# Patient Record
Sex: FEMALE
[De-identification: names, ages, dates, MRNs, and addresses within clinical notes are randomized per-mention and may not be internally consistent; named-entity substitution may affect disease eponyms.]

---

## 2019-03-17 ENCOUNTER — TRANSCRIPTION ENCOUNTER (OUTPATIENT)
Age: 49
End: 2019-03-17

## 2020-11-09 ENCOUNTER — TRANSCRIPTION ENCOUNTER (OUTPATIENT)
Age: 50
End: 2020-11-09

## 2021-09-11 ENCOUNTER — TRANSCRIPTION ENCOUNTER (OUTPATIENT)
Age: 51
End: 2021-09-11

## 2021-10-08 ENCOUNTER — TRANSCRIPTION ENCOUNTER (OUTPATIENT)
Age: 51
End: 2021-10-08

## 2022-06-02 ENCOUNTER — NON-APPOINTMENT (OUTPATIENT)
Age: 52
End: 2022-06-02

## 2022-08-16 ENCOUNTER — NON-APPOINTMENT (OUTPATIENT)
Age: 52
End: 2022-08-16

## 2023-03-02 ENCOUNTER — NON-APPOINTMENT (OUTPATIENT)
Age: 53
End: 2023-03-02

## 2023-03-15 ENCOUNTER — NON-APPOINTMENT (OUTPATIENT)
Age: 53
End: 2023-03-15

## 2023-04-28 PROBLEM — Z00.00 ENCOUNTER FOR PREVENTIVE HEALTH EXAMINATION: Status: ACTIVE | Noted: 2023-04-28

## 2023-05-08 ENCOUNTER — APPOINTMENT (OUTPATIENT)
Dept: ORTHOPEDIC SURGERY | Facility: CLINIC | Age: 53
End: 2023-05-08

## 2024-08-29 ENCOUNTER — APPOINTMENT (OUTPATIENT)
Dept: ORTHOPEDIC SURGERY | Facility: CLINIC | Age: 54
End: 2024-08-29
Payer: COMMERCIAL

## 2024-08-29 VITALS — BODY MASS INDEX: 33.63 KG/M2 | HEIGHT: 64 IN | WEIGHT: 197 LBS

## 2024-08-29 DIAGNOSIS — Z78.9 OTHER SPECIFIED HEALTH STATUS: ICD-10-CM

## 2024-08-29 DIAGNOSIS — M25.561 PAIN IN RIGHT KNEE: ICD-10-CM

## 2024-08-29 DIAGNOSIS — M17.11 UNILATERAL PRIMARY OSTEOARTHRITIS, RIGHT KNEE: ICD-10-CM

## 2024-08-29 DIAGNOSIS — E11.9 TYPE 2 DIABETES MELLITUS W/OUT COMPLICATIONS: ICD-10-CM

## 2024-08-29 DIAGNOSIS — G89.29 PAIN IN RIGHT KNEE: ICD-10-CM

## 2024-08-29 PROCEDURE — 99204 OFFICE O/P NEW MOD 45 MIN: CPT | Mod: 25

## 2024-08-29 PROCEDURE — 73562 X-RAY EXAM OF KNEE 3: CPT | Mod: RT

## 2024-08-29 PROCEDURE — J3490M: CUSTOM

## 2024-08-29 PROCEDURE — 20610 DRAIN/INJ JOINT/BURSA W/O US: CPT | Mod: RT

## 2024-08-29 RX ORDER — TIRZEPATIDE 7.5 MG/.5ML
INJECTION, SOLUTION SUBCUTANEOUS
Refills: 0 | Status: ACTIVE | COMMUNITY

## 2024-08-29 RX ORDER — MELOXICAM 15 MG/1
15 TABLET ORAL
Qty: 30 | Refills: 0 | Status: ACTIVE | COMMUNITY
Start: 2024-08-29 | End: 1900-01-01

## 2024-08-29 RX ORDER — LEVOTHYROXINE SODIUM 100 UG/1
100 TABLET ORAL
Refills: 0 | Status: ACTIVE | COMMUNITY

## 2024-08-30 PROBLEM — M17.11 PRIMARY OSTEOARTHRITIS OF RIGHT KNEE: Status: ACTIVE | Noted: 2024-08-29

## 2024-08-30 NOTE — IMAGING
[Right] : right knee [All Views] : anteroposterior, lateral, skyline, and anteroposterior standing [de-identified] : The patient is a well appearing 54 year  old female of their stated age. Patient ambulates with a normal gait. Negative straight leg raise bilateral.    Effected Knee: RIGHT  ROM:  0-130 degrees Lachman: Negative Pivot Shift: Negative Anterior Drawer: Negative Posterior Drawer / Sag: Negative Varus Stress 0 degrees: Stable Varus Stress 30 degrees: Stable Valgus Stress 0 degrees: Stable Valgus Stress 30 degrees: Stable Medial Bari: Negative Lateral Bari: Negative Patella Glide: 2+ Patella Apprehension: Negative Patella Grind: POSITIVE  Pes [place] Valgus: Negative Pes Cavus: Negative   Palpation: Medial Joint Line: TENDER  Lateral Joint Line: TENDER  Medial Collateral Ligament: Nontender Lateral Collateral Ligament/PLC: Nontender Distal Femur: Nontender Proximal Tibia: Nontender Tibial Tubercle: Nontender Gerdy's Tubercle: Nontender Distal Pole Patella: Nontender Quadriceps Tendon: Nontender &  Intact Patella Tendon: Nontender &  Intact Medial Femoral Condyle: Nontender Medial Distal Hamstring/PES: Nontender Lateral Distal Hamstring: Nontender & Stable Iliotibial Band: Nontender Medial Patellofemoral Ligament: Nontender Adductor: Nontender Proximal GSC-Plantaris: Nontender Calf: Supple & Nontender   Inspection: Deformity: No Erythema: No Ecchymosis: No Abrasions: No Effusion: MILD  Prepatellar Bursitis: No Neurologic Exam: Sensation L4-S1: Grossly Intact Motor Exam: Quadriceps: 5- out of 5 Hamstrings: 5 out of 5 EHL: 5 out of 5 FHL: 5 out of 5 TA: 5 out of 5 GS: 5 out of 5 Circulatory/Pulses: Dorsalis Pedis: 2+ Posterior Tibialis: 2+ Additional Pertinent Findings: None   Contralateral Knee:                            ROM: 0-145 degrees Other Pertinent Findings: None   Assessment: The patient is a 54-year-old female with right knee pain and radiographic and physical exam findings consistent with tricompartmental arthritis.  The patient's condition is chronic. Documents/Results Reviewed Today: X-Ray right knee (in office and from 2019) Tests/Studies Independently Interpreted Today: Previous X-Ray right knee (2019) reveals evidence of significant lateral compartment narrowing with mild patellofemoral arthritis. X-Ray right knee in office today reveals evidence of advancing lateral compartment narrowing, mild to moderate patellofemoral and medial compartment arthritis with associated osteophytes.  Pertinent findings include: 0-130, 5-/5 quad strength, mild effusion, MJLT, LJLT, +patellofemoral grind.  Confounding medical conditions/concerns: Was a diabetic however now controlled, lost weight and A1C is now below 60. Previously treated with left knee Toradol injection (11/22/19), provided relief. S/p left knee EUA arthroscopic partial medial meniscectomy, [place] [facility] [place] with Dr. Ballard.    Plan: Discussed treatment options for the patient's tricompartmental knee arthritis, both operative vs non-operative. Patient will start physical therapy and HEP to focus on strengthening. Advised patient to obtain Reparel sleeve to aid in inflammatory process. Discussed treatment options in the form of injections to aid in pain, inflammation, and discomfort. Patient elected to receive right knee superolateral 9/1 CSI. Advised patient to rest and ice the area as tolerated. The patient was prescribed Meloxicam 15mg daily for two weeks, then PRN for pain management and inflammation - use as directed and take with food. The patient is aware and understands that if she fails all conservative treatment modalities, she may have to consider a total knee arthroplasty. In the interim, we will focus on conservative management of arthritic related pain. Dependent on relief to right knee CSI, we may discuss Visco supplementation. Modify activity as discussed.  Tests Ordered: None  Prescription Medications Ordered: Meloxicam 15mg Braces/DME Ordered: Reparel knee sleeve  Activity/Work/Sports Status: As tolerated, limit deep squatting and stairs Additional Instructions: Topical Voltaren gel  Follow-Up: 4 weeks   Procedure Note: Musculoskeletal Injection Diagnosis: Right knee tricompartmental arthritis   Procedure: Right knee, superolateral, 9/1 CSI   Indication: The patient has had persistent pain despite conservative treatment.  Risks, benefits and alternatives to procedure were discussed; all questions were answered to the patient's apparent satisfaction and informed consent obtained.  The patient denied prior problems with local anesthetics, injectable cortisones, chicken allergy, coagulopathy and no relevant drug or preservative allergies or sensitivities.   The area of injection was prepared in a sterile fashion. Prior to injection a 'Time Out' was conducted in accordance with [employer] policy and the site and nature of procedure verified with the patient.   Procedure: The procedure was carried out utilizing sterile technique from a superolateral arthroscopic portal position.   0cc of clear synovial fluid was aspirated. The specimen: (X) appeared benign and was discarded ( ) was sent for Culture / Cell Count / Crystal analysis / [_].]   Injection into the target area with care taken to aspirate frequently to minimize the risk of intravascular injection was performed with: ( ) 1cc of Depomedrol (80mg/ml) (X) 1cc of Dexamethasone (10mg/ml) ( ) 1cc of Toradol (30mg/ml) (X) 9cc of 0.5% Bupivacaine ( ) 1cc of 1% Lidocaine ( ) 5cc of 32mg Zilretta, prepared and diluted per  instructions ( ) 2 cc of Hylan G-F 20 (Synvisc) 16mg/2ml ( ) 6 cc of Hylan G-F 20 (SynvisoOne) 16mg/2ml ( ) 2cc of Euflexxa ( ) 2cc of Orthovisc ( ) 2cc of GelOne ( ) 3cc of Durolane (20mg/ml)   Patient tolerated the procedure well and direct pressure was applied for hemostasis. The patient was reminded of potential post-injection risks including, but not limited to, delayed hypersensitivity reactions and/or infection.  The patient verified that they had the office and the Emergency Room's contact information if any problems should arise.  After several minutes, the patient informed me that they felt fine and was released from the office.   The patient's current medication management of their orthopedic diagnosis was reviewed today: (1) We discussed a comprehensive treatment plan that included possible pharmaceutical management involving the use of prescription strength medications including but not limited to options such as oral Naprosyn 500mg BID, once daily Meloxicam 15 mg, or 500-650 mg Tylenol versus over the counter oral medications and topical prescription NSAID Pennsaid vs over the counter Voltaren gel.  Based on our extensive discussion, the patient was prescribed Meloxicam 15mg daily for two weeks.  It will then be used PRN for pain, inflammation and discomfort. (2) There is a moderate risk of morbidity with further treatment, especially from use of prescription strength medications and possible side effects of these medications which include upset stomach with oral medications, skin reactions to topical medications and cardiac/renal issues with long term use. (3) I recommended that the patient follow-up with their medical physician to discuss any significant specific potential issues with long term medication use such as interactions with current medications or with exacerbation of underlying medical comorbidities. (4) The benefits and risks associated with use of injectable, oral or topical, prescription and over the counter anti-inflammatory medications were discussed with the patient. The patient voiced understanding of the risks including but not limited to bleeding, stroke, kidney dysfunction, heart disease, and were referred to the black box warning label for further information.   Zora HA attest that this documentation has been prepared under the direction and in the presence of Patricia Franks PA-C.  The documentation recorded by the scribe accurately reflects the service Patricia HA PA-C, personally performed and the decisions made by me. [FreeTextEntry9] : Previous X-Ray right knee (2019) reveals evidence of significant lateral compartment narrowing with mild patellofemoral arthritis.

## 2024-08-30 NOTE — IMAGING
[Right] : right knee [All Views] : anteroposterior, lateral, skyline, and anteroposterior standing [de-identified] : The patient is a well appearing 54 year  old female of their stated age. Patient ambulates with a normal gait. Negative straight leg raise bilateral.    Effected Knee: RIGHT  ROM:  0-130 degrees Lachman: Negative Pivot Shift: Negative Anterior Drawer: Negative Posterior Drawer / Sag: Negative Varus Stress 0 degrees: Stable Varus Stress 30 degrees: Stable Valgus Stress 0 degrees: Stable Valgus Stress 30 degrees: Stable Medial Bari: Negative Lateral Bari: Negative Patella Glide: 2+ Patella Apprehension: Negative Patella Grind: POSITIVE  Pes [place] Valgus: Negative Pes Cavus: Negative   Palpation: Medial Joint Line: TENDER  Lateral Joint Line: TENDER  Medial Collateral Ligament: Nontender Lateral Collateral Ligament/PLC: Nontender Distal Femur: Nontender Proximal Tibia: Nontender Tibial Tubercle: Nontender Gerdy's Tubercle: Nontender Distal Pole Patella: Nontender Quadriceps Tendon: Nontender &  Intact Patella Tendon: Nontender &  Intact Medial Femoral Condyle: Nontender Medial Distal Hamstring/PES: Nontender Lateral Distal Hamstring: Nontender & Stable Iliotibial Band: Nontender Medial Patellofemoral Ligament: Nontender Adductor: Nontender Proximal GSC-Plantaris: Nontender Calf: Supple & Nontender   Inspection: Deformity: No Erythema: No Ecchymosis: No Abrasions: No Effusion: MILD  Prepatellar Bursitis: No Neurologic Exam: Sensation L4-S1: Grossly Intact Motor Exam: Quadriceps: 5- out of 5 Hamstrings: 5 out of 5 EHL: 5 out of 5 FHL: 5 out of 5 TA: 5 out of 5 GS: 5 out of 5 Circulatory/Pulses: Dorsalis Pedis: 2+ Posterior Tibialis: 2+ Additional Pertinent Findings: None   Contralateral Knee:                            ROM: 0-145 degrees Other Pertinent Findings: None   Assessment: The patient is a 54-year-old female with right knee pain and radiographic and physical exam findings consistent with tricompartmental arthritis.  The patient's condition is chronic. Documents/Results Reviewed Today: X-Ray right knee (in office and from 2019) Tests/Studies Independently Interpreted Today: Previous X-Ray right knee (2019) reveals evidence of significant lateral compartment narrowing with mild patellofemoral arthritis. X-Ray right knee in office today reveals evidence of advancing lateral compartment narrowing, mild to moderate patellofemoral and medial compartment arthritis with associated osteophytes.  Pertinent findings include: 0-130, 5-/5 quad strength, mild effusion, MJLT, LJLT, +patellofemoral grind.  Confounding medical conditions/concerns: Was a diabetic however now controlled, lost weight and A1C is now below 60. Previously treated with left knee Toradol injection (11/22/19), provided relief. S/p left knee EUA arthroscopic partial medial meniscectomy, [place] [facility] [place] with Dr. Ballard.    Plan: Discussed treatment options for the patient's tricompartmental knee arthritis, both operative vs non-operative. Patient will start physical therapy and HEP to focus on strengthening. Advised patient to obtain Reparel sleeve to aid in inflammatory process. Discussed treatment options in the form of injections to aid in pain, inflammation, and discomfort. Patient elected to receive right knee superolateral 9/1 CSI. Advised patient to rest and ice the area as tolerated. The patient was prescribed Meloxicam 15mg daily for two weeks, then PRN for pain management and inflammation - use as directed and take with food. The patient is aware and understands that if she fails all conservative treatment modalities, she may have to consider a total knee arthroplasty. In the interim, we will focus on conservative management of arthritic related pain. Dependent on relief to right knee CSI, we may discuss Visco supplementation. Modify activity as discussed.  Tests Ordered: None  Prescription Medications Ordered: Meloxicam 15mg Braces/DME Ordered: Reparel knee sleeve  Activity/Work/Sports Status: As tolerated, limit deep squatting and stairs Additional Instructions: Topical Voltaren gel  Follow-Up: 4 weeks   Procedure Note: Musculoskeletal Injection Diagnosis: Right knee tricompartmental arthritis   Procedure: Right knee, superolateral, 9/1 CSI   Indication: The patient has had persistent pain despite conservative treatment.  Risks, benefits and alternatives to procedure were discussed; all questions were answered to the patient's apparent satisfaction and informed consent obtained.  The patient denied prior problems with local anesthetics, injectable cortisones, chicken allergy, coagulopathy and no relevant drug or preservative allergies or sensitivities.   The area of injection was prepared in a sterile fashion. Prior to injection a 'Time Out' was conducted in accordance with [employer] policy and the site and nature of procedure verified with the patient.   Procedure: The procedure was carried out utilizing sterile technique from a superolateral arthroscopic portal position.   0cc of clear synovial fluid was aspirated. The specimen: (X) appeared benign and was discarded ( ) was sent for Culture / Cell Count / Crystal analysis / [_].]   Injection into the target area with care taken to aspirate frequently to minimize the risk of intravascular injection was performed with: ( ) 1cc of Depomedrol (80mg/ml) (X) 1cc of Dexamethasone (10mg/ml) ( ) 1cc of Toradol (30mg/ml) (X) 9cc of 0.5% Bupivacaine ( ) 1cc of 1% Lidocaine ( ) 5cc of 32mg Zilretta, prepared and diluted per  instructions ( ) 2 cc of Hylan G-F 20 (Synvisc) 16mg/2ml ( ) 6 cc of Hylan G-F 20 (SynvisoOne) 16mg/2ml ( ) 2cc of Euflexxa ( ) 2cc of Orthovisc ( ) 2cc of GelOne ( ) 3cc of Durolane (20mg/ml)   Patient tolerated the procedure well and direct pressure was applied for hemostasis. The patient was reminded of potential post-injection risks including, but not limited to, delayed hypersensitivity reactions and/or infection.  The patient verified that they had the office and the Emergency Room's contact information if any problems should arise.  After several minutes, the patient informed me that they felt fine and was released from the office.   The patient's current medication management of their orthopedic diagnosis was reviewed today: (1) We discussed a comprehensive treatment plan that included possible pharmaceutical management involving the use of prescription strength medications including but not limited to options such as oral Naprosyn 500mg BID, once daily Meloxicam 15 mg, or 500-650 mg Tylenol versus over the counter oral medications and topical prescription NSAID Pennsaid vs over the counter Voltaren gel.  Based on our extensive discussion, the patient was prescribed Meloxicam 15mg daily for two weeks.  It will then be used PRN for pain, inflammation and discomfort. (2) There is a moderate risk of morbidity with further treatment, especially from use of prescription strength medications and possible side effects of these medications which include upset stomach with oral medications, skin reactions to topical medications and cardiac/renal issues with long term use. (3) I recommended that the patient follow-up with their medical physician to discuss any significant specific potential issues with long term medication use such as interactions with current medications or with exacerbation of underlying medical comorbidities. (4) The benefits and risks associated with use of injectable, oral or topical, prescription and over the counter anti-inflammatory medications were discussed with the patient. The patient voiced understanding of the risks including but not limited to bleeding, stroke, kidney dysfunction, heart disease, and were referred to the black box warning label for further information.   Zora HA attest that this documentation has been prepared under the direction and in the presence of Patricia Franks PA-C.  The documentation recorded by the scribe accurately reflects the service Patricia HA PA-C, personally performed and the decisions made by me. [FreeTextEntry9] : Previous X-Ray right knee (2019) reveals evidence of significant lateral compartment narrowing with mild patellofemoral arthritis.

## 2024-08-30 NOTE — HISTORY OF PRESENT ILLNESS
[de-identified] : The patient is a 54 year old R hand dominant female  who presents today for R knee pain. Date of Injury/Onset: ~ 2019  Pain: At Rest: 2-3/10 With Activity: 8-9/10 Mechanism of injury: gradual onset, no specific YUE This is NOT a Work Related Injury being treated under Worker's Compensation. This is NOT an athletic injury occurring associated with an interscholastic or organized sports team. Quality of symptoms: instability, swelling tightness in her posterior knee, sharp  Improves with: rest, NSAIDs, ice Worse with: twisting, yoga, lunges Prior treatment: none Prior Imaging: none Out of work/sport: currently working School/Sport/Position/Occupation:   Additional Information: HX: L knee PMM, chondroplasty [facility] with Dr. Ballard in 2021. Had Gastric sleeve surgery in 08/2023

## 2024-08-30 NOTE — HISTORY OF PRESENT ILLNESS
[de-identified] : The patient is a 54 year old R hand dominant female  who presents today for R knee pain. Date of Injury/Onset: ~ 2019  Pain: At Rest: 2-3/10 With Activity: 8-9/10 Mechanism of injury: gradual onset, no specific YUE This is NOT a Work Related Injury being treated under Worker's Compensation. This is NOT an athletic injury occurring associated with an interscholastic or organized sports team. Quality of symptoms: instability, swelling tightness in her posterior knee, sharp  Improves with: rest, NSAIDs, ice Worse with: twisting, yoga, lunges Prior treatment: none Prior Imaging: none Out of work/sport: currently working School/Sport/Position/Occupation:   Additional Information: HX: L knee PMM, chondroplasty [facility] with Dr. Ballard in 2021. Had Gastric sleeve surgery in 08/2023

## 2024-09-06 ENCOUNTER — APPOINTMENT (OUTPATIENT)
Dept: ORTHOPEDIC SURGERY | Facility: CLINIC | Age: 54
End: 2024-09-06

## 2024-09-17 ENCOUNTER — APPOINTMENT (OUTPATIENT)
Dept: ORTHOPEDIC SURGERY | Facility: CLINIC | Age: 54
End: 2024-09-17
Payer: COMMERCIAL

## 2024-09-17 DIAGNOSIS — M25.531 PAIN IN RIGHT WRIST: ICD-10-CM

## 2024-09-17 PROCEDURE — 99204 OFFICE O/P NEW MOD 45 MIN: CPT

## 2024-09-17 PROCEDURE — 73110 X-RAY EXAM OF WRIST: CPT | Mod: RT

## 2024-09-17 NOTE — IMAGING
[de-identified] : Right wrist with no swelling nor erythema. Able to make fist, oppose thumb to small finger and abduct fingers, no overt atrophy. +Phalen's, -tinel at carpal, -tinel at Guyon, -tinel at cubital. -froment, -wartenberg. Intact sensation in median and intact at superficial radial and intact in small and ulnar ring finger(normal at ulnar hand) prior to provocative testing. <2sec cap refill. +ttp at [facility].  Right wrist radiographs with no fracture nor dislocation. Carpus aligned. (3-view)   Left wrist with no swelling nor erythema. Able to make fist, oppose thumb to small finger and abduct fingers, no overt atrophy. +Phalen's, -tinel at carpal, -tinel at Guyon, -tinel at cubital. -froment, -wartenberg. Tignling sensation in median and intact at superficial radial and intact in small and ulnar ring finger(normal at ulnar hand) prior to provocative testing. <2sec cap refill.

## 2024-09-17 NOTE — ASSESSMENT
[FreeTextEntry1] : Right ECU tendonitis/TFCC sprain - reviewed radiographs and pathoanatomy with the patient. Discussed management to consist of NSAIDs prn, wrist brace prn (wrist widget), elevation, activity modification and OT.   Bilateral CTS - reviewed pathoanatomy. Encouraged nighttime cockup wrist bracing. Will obtain bilateral UE EMG/NCV in light of severity of symptoms - patient will follow-up thereafter to discuss results and develop plan-of-care.  F/u after EMG/NCV

## 2024-09-17 NOTE — HISTORY OF PRESENT ILLNESS
[de-identified] : 54F, RHD presents with right wrist pain since 2023, no specific cause of injury/ trauma. Patient reports experiencing weakness and burning pain radiating to forearm, worsens with lifting and twisting. Admits to experiencing numbness and tingling as well. History of bilateral carpal tunnel syndrome.

## 2024-09-24 ENCOUNTER — APPOINTMENT (OUTPATIENT)
Dept: NEUROLOGY | Facility: CLINIC | Age: 54
End: 2024-09-24
Payer: COMMERCIAL

## 2024-09-24 PROCEDURE — 95911 NRV CNDJ TEST 9-10 STUDIES: CPT

## 2024-09-24 PROCEDURE — 95886 MUSC TEST DONE W/N TEST COMP: CPT

## 2024-09-25 ENCOUNTER — RX RENEWAL (OUTPATIENT)
Age: 54
End: 2024-09-25

## 2024-09-27 ENCOUNTER — APPOINTMENT (OUTPATIENT)
Dept: ORTHOPEDIC SURGERY | Facility: CLINIC | Age: 54
End: 2024-09-27
Payer: COMMERCIAL

## 2024-09-27 DIAGNOSIS — G56.00 CARPAL TUNNEL SYNDROME, UNSPECIFIED UPPER LIMB: ICD-10-CM

## 2024-09-27 PROCEDURE — 99214 OFFICE O/P EST MOD 30 MIN: CPT | Mod: 57

## 2024-09-28 PROBLEM — G56.00 CARPAL TUNNEL SYNDROME: Status: ACTIVE | Noted: 2024-09-17

## 2024-09-28 NOTE — HISTORY OF PRESENT ILLNESS
[de-identified] : 54F, RHD presents with right wrist pain since 2023, no specific cause of injury/ trauma. Patient reports experiencing weakness and burning pain radiating to forearm, worsens with lifting and twisting. Admits to experiencing numbness and tingling as well. History of bilateral carpal tunnel syndrome.   09/27/24: Follow up for bilateral hands. Patient here today to go over EMG results.

## 2024-09-28 NOTE — ASSESSMENT
[FreeTextEntry1] : Right ECU tendonitis/TFCC sprain - reviewed radiographs and pathoanatomy with the patient. Discussed management to consist of NSAIDs prn, wrist brace prn (wrist widget), elevation, activity modification and OT.   Bilateral CTS - reviewed bilateral EMG/NCV results of right mild CTS and left moderate CTS. Given patient's severity and continued symptoms despite nonoperative management, patient indicated for carpal tunnel release. We did have discussion of continuing nonoperative management; however, patient communicated symptoms were interfering with sleep and ADLs and she wanted to proceed with operative release. We discussed the risks, benefits and alternatives to carpal tunnel release including risk of neurovascular injury, wound dehiscence/infection, continued numbness, continued weakness, need for reoperation, DVT and medical complications associated with anesthesia. Discussed that decompression halts progression, but that improvement with existing sensory or motor deficits are not guaranteed to return. Patient understood this conversation and all questions were answered. She elected to proceed.  Plan for left endoscopic carpal tunnel release under local with sedation. [facility].  F/u for surgery, 10 days thereafter

## 2024-09-28 NOTE — ASSESSMENT
[FreeTextEntry1] : Right ECU tendonitis/TFCC sprain - reviewed radiographs and pathoanatomy with the patient. Discussed management to consist of NSAIDs prn, wrist brace prn (wrist widget), elevation, activity modification and OT.   Bilateral CTS - reviewed bilateral EMG/NCV results of right mild CTS and left moderate CTS. Given patient's severity and continued symptoms despite nonoperative management, patient indicated for carpal tunnel release. We did have discussion of continuing nonoperative management; however, patient communicated symptoms were interfering with sleep and ADLs and she wanted to proceed with operative release. We discussed the risks, benefits and alternatives to carpal tunnel release including risk of neurovascular injury, wound dehiscence/infection, continued numbness, continued weakness, need for reoperation, DVT and medical complications associated with anesthesia. Discussed that decompression halts progression, but that improvement with existing sensory or motor deficits are not guaranteed to return. Patient understood this conversation and all questions were answered. She elected to proceed.  Plan for left endoscopic carpal tunnel release under local with sedation. [facility].  F/u for surgery, 10 days thereafter

## 2024-09-28 NOTE — HISTORY OF PRESENT ILLNESS
[de-identified] : 54F, RHD presents with right wrist pain since 2023, no specific cause of injury/ trauma. Patient reports experiencing weakness and burning pain radiating to forearm, worsens with lifting and twisting. Admits to experiencing numbness and tingling as well. History of bilateral carpal tunnel syndrome.   09/27/24: Follow up for bilateral hands. Patient here today to go over EMG results.

## 2024-09-28 NOTE — IMAGING
[de-identified] : Right wrist with no swelling nor erythema. Able to make fist, oppose thumb to small finger and abduct fingers, no overt atrophy. +Phalen's, -tinel at carpal, -tinel at Guyon, -tinel at cubital. -froment, -wartenberg. Intact sensation in median and intact at superficial radial and intact in small and ulnar ring finger(normal at ulnar hand) prior to provocative testing. <2sec cap refill. +ttp at [facility].  Right wrist radiographs with no fracture nor dislocation. Carpus aligned. (3-view)   Left wrist with no swelling nor erythema. Able to make fist, oppose thumb to small finger and abduct fingers, no overt atrophy. +Phalen's, -tinel at carpal, -tinel at Guyon, -tinel at cubital. -froment, -wartenberg. Tignling sensation in median and intact at superficial radial and intact in small and ulnar ring finger(normal at ulnar hand) prior to provocative testing. <2sec cap refill.

## 2024-09-28 NOTE — IMAGING
[de-identified] : Right wrist with no swelling nor erythema. Able to make fist, oppose thumb to small finger and abduct fingers, no overt atrophy. +Phalen's, -tinel at carpal, -tinel at Guyon, -tinel at cubital. -froment, -wartenberg. Intact sensation in median and intact at superficial radial and intact in small and ulnar ring finger(normal at ulnar hand) prior to provocative testing. <2sec cap refill. +ttp at [facility].  Right wrist radiographs with no fracture nor dislocation. Carpus aligned. (3-view)   Left wrist with no swelling nor erythema. Able to make fist, oppose thumb to small finger and abduct fingers, no overt atrophy. +Phalen's, -tinel at carpal, -tinel at Guyon, -tinel at cubital. -froment, -wartenberg. Tignling sensation in median and intact at superficial radial and intact in small and ulnar ring finger(normal at ulnar hand) prior to provocative testing. <2sec cap refill.

## 2024-10-04 ENCOUNTER — NON-APPOINTMENT (OUTPATIENT)
Age: 54
End: 2024-10-04

## 2024-10-10 ENCOUNTER — APPOINTMENT (OUTPATIENT)
Dept: ORTHOPEDIC SURGERY | Facility: CLINIC | Age: 54
End: 2024-10-10
Payer: COMMERCIAL

## 2024-10-10 VITALS — HEIGHT: 64 IN | WEIGHT: 197 LBS | BODY MASS INDEX: 33.63 KG/M2

## 2024-10-10 DIAGNOSIS — M25.561 PAIN IN RIGHT KNEE: ICD-10-CM

## 2024-10-10 DIAGNOSIS — G89.29 PAIN IN RIGHT KNEE: ICD-10-CM

## 2024-10-10 PROCEDURE — 99204 OFFICE O/P NEW MOD 45 MIN: CPT

## 2024-10-14 DIAGNOSIS — M17.11 UNILATERAL PRIMARY OSTEOARTHRITIS, RIGHT KNEE: ICD-10-CM

## 2024-10-14 RX ORDER — HYALURONATE SODIUM, STABILIZED 60 MG/3 ML
60 SYRINGE (ML) INTRAARTICULAR
Qty: 1 | Refills: 0 | Status: ACTIVE | COMMUNITY
Start: 2024-10-14 | End: 1900-01-01

## 2024-10-17 ENCOUNTER — APPOINTMENT (OUTPATIENT)
Dept: ORTHOPEDIC SURGERY | Facility: AMBULATORY SURGERY CENTER | Age: 54
End: 2024-10-17
Payer: COMMERCIAL

## 2024-10-17 PROCEDURE — 29848 WRIST ENDOSCOPY/SURGERY: CPT | Mod: LT

## 2024-10-23 ENCOUNTER — RX RENEWAL (OUTPATIENT)
Age: 54
End: 2024-10-23

## 2024-10-29 ENCOUNTER — APPOINTMENT (OUTPATIENT)
Dept: ORTHOPEDIC SURGERY | Facility: CLINIC | Age: 54
End: 2024-10-29
Payer: COMMERCIAL

## 2024-10-29 DIAGNOSIS — G56.00 CARPAL TUNNEL SYNDROME, UNSPECIFIED UPPER LIMB: ICD-10-CM

## 2024-10-29 PROCEDURE — 99024 POSTOP FOLLOW-UP VISIT: CPT

## 2024-11-01 ENCOUNTER — APPOINTMENT (OUTPATIENT)
Dept: ORTHOPEDIC SURGERY | Facility: CLINIC | Age: 54
End: 2024-11-01
Payer: COMMERCIAL

## 2024-11-01 VITALS — BODY MASS INDEX: 33.63 KG/M2 | HEIGHT: 64 IN | WEIGHT: 197 LBS

## 2024-11-01 DIAGNOSIS — M17.11 UNILATERAL PRIMARY OSTEOARTHRITIS, RIGHT KNEE: ICD-10-CM

## 2024-11-01 DIAGNOSIS — M25.561 PAIN IN RIGHT KNEE: ICD-10-CM

## 2024-11-01 DIAGNOSIS — G89.29 PAIN IN RIGHT KNEE: ICD-10-CM

## 2024-11-01 PROCEDURE — 20611 DRAIN/INJ JOINT/BURSA W/US: CPT | Mod: RT

## 2024-11-07 ENCOUNTER — APPOINTMENT (OUTPATIENT)
Dept: ORTHOPEDIC SURGERY | Facility: AMBULATORY SURGERY CENTER | Age: 54
End: 2024-11-07
Payer: COMMERCIAL

## 2024-11-07 PROCEDURE — 29848 WRIST ENDOSCOPY/SURGERY: CPT | Mod: 79,RT

## 2024-11-18 ENCOUNTER — APPOINTMENT (OUTPATIENT)
Dept: ORTHOPEDIC SURGERY | Facility: CLINIC | Age: 54
End: 2024-11-18
Payer: COMMERCIAL

## 2024-11-18 VITALS — WEIGHT: 197 LBS | BODY MASS INDEX: 33.63 KG/M2 | HEIGHT: 64 IN

## 2024-11-18 DIAGNOSIS — G56.00 CARPAL TUNNEL SYNDROME, UNSPECIFIED UPPER LIMB: ICD-10-CM

## 2024-11-18 PROCEDURE — 99024 POSTOP FOLLOW-UP VISIT: CPT

## 2024-12-26 ENCOUNTER — RX RENEWAL (OUTPATIENT)
Age: 54
End: 2024-12-26

## 2025-01-24 ENCOUNTER — RX RENEWAL (OUTPATIENT)
Age: 55
End: 2025-01-24

## 2025-03-03 ENCOUNTER — RX RENEWAL (OUTPATIENT)
Age: 55
End: 2025-03-03

## 2025-04-07 ENCOUNTER — RX RENEWAL (OUTPATIENT)
Age: 55
End: 2025-04-07

## 2025-05-07 ENCOUNTER — RX RENEWAL (OUTPATIENT)
Age: 55
End: 2025-05-07

## 2025-05-08 ENCOUNTER — APPOINTMENT (OUTPATIENT)
Dept: ORTHOPEDIC SURGERY | Facility: CLINIC | Age: 55
End: 2025-05-08

## 2025-06-11 ENCOUNTER — RX RENEWAL (OUTPATIENT)
Age: 55
End: 2025-06-11

## 2025-06-23 ENCOUNTER — APPOINTMENT (OUTPATIENT)
Dept: ORTHOPEDIC SURGERY | Facility: CLINIC | Age: 55
End: 2025-06-23
Payer: COMMERCIAL

## 2025-06-23 PROCEDURE — 20550 NJX 1 TENDON SHEATH/LIGAMENT: CPT | Mod: F8

## 2025-06-23 PROCEDURE — 99214 OFFICE O/P EST MOD 30 MIN: CPT | Mod: 25
